# Patient Record
Sex: MALE | Race: BLACK OR AFRICAN AMERICAN | NOT HISPANIC OR LATINO | URBAN - METROPOLITAN AREA
[De-identification: names, ages, dates, MRNs, and addresses within clinical notes are randomized per-mention and may not be internally consistent; named-entity substitution may affect disease eponyms.]

---

## 2019-07-20 ENCOUNTER — EMERGENCY (EMERGENCY)
Facility: HOSPITAL | Age: 24
LOS: 0 days | Discharge: HOME | End: 2019-07-20
Payer: COMMERCIAL

## 2019-07-20 VITALS
OXYGEN SATURATION: 98 % | TEMPERATURE: 98 F | WEIGHT: 201.94 LBS | HEIGHT: 72 IN | DIASTOLIC BLOOD PRESSURE: 86 MMHG | HEART RATE: 72 BPM | SYSTOLIC BLOOD PRESSURE: 137 MMHG | RESPIRATION RATE: 18 BRPM

## 2019-07-20 DIAGNOSIS — H92.01 OTALGIA, RIGHT EAR: ICD-10-CM

## 2019-07-20 DIAGNOSIS — H65.191 OTHER ACUTE NONSUPPURATIVE OTITIS MEDIA, RIGHT EAR: ICD-10-CM

## 2019-07-20 DIAGNOSIS — H61.21 IMPACTED CERUMEN, RIGHT EAR: ICD-10-CM

## 2019-07-20 PROCEDURE — 69209 REMOVE IMPACTED EAR WAX UNI: CPT

## 2019-07-20 PROCEDURE — 99283 EMERGENCY DEPT VISIT LOW MDM: CPT | Mod: 25

## 2019-07-20 RX ORDER — IBUPROFEN 200 MG
600 TABLET ORAL ONCE
Refills: 0 | Status: COMPLETED | OUTPATIENT
Start: 2019-07-20 | End: 2019-07-20

## 2019-07-20 RX ADMIN — Medication 1 TABLET(S): at 12:19

## 2019-07-20 RX ADMIN — Medication 600 MILLIGRAM(S): at 12:19

## 2019-07-20 NOTE — ED PROVIDER NOTE - OBJECTIVE STATEMENT
23M here with right ear pain x 2 days, fullness in the ear. Denies fever, chills,nausea, vomiting. + recent URI

## 2019-07-20 NOTE — ED PROVIDER NOTE - ENMT, MLM
Airway patent, Nasal mucosa clear. Mouth with normal mucosa. Throat has no vesicles, no oropharyngeal exudates and uvula is midline.    cerumen impaction right ear- removed as per procedure note, revealing TM erythematous and bulging  No canal inflammation or mastoid tenderness

## 2019-07-20 NOTE — ED ADULT NURSE NOTE - NSIMPLEMENTINTERV_GEN_ALL_ED
Implemented All Universal Safety Interventions:  [place] to call system. Call bell, personal items and telephone within reach. Instruct patient to call for assistance. Room bathroom lighting operational. Non-slip footwear when patient is off stretcher. Physically safe environment: no spills, clutter or unnecessary equipment. Stretcher in lowest position, wheels locked, appropriate side rails in place.

## 2019-07-20 NOTE — ED PROVIDER NOTE - CARDIOVASCULAR NEGATIVE STATEMENT, MLM
no chest pain and no edema. pt will stop  Azilect for 10 days prior to surgery as per PMD  will continue carbidopa

## 2020-09-12 ENCOUNTER — EMERGENCY (EMERGENCY)
Facility: HOSPITAL | Age: 25
LOS: 0 days | Discharge: HOME | End: 2020-09-12
Payer: COMMERCIAL

## 2020-09-12 VITALS
WEIGHT: 195.11 LBS | HEART RATE: 96 BPM | RESPIRATION RATE: 16 BRPM | HEIGHT: 72 IN | OXYGEN SATURATION: 96 % | SYSTOLIC BLOOD PRESSURE: 131 MMHG | TEMPERATURE: 98 F | DIASTOLIC BLOOD PRESSURE: 95 MMHG

## 2020-09-12 LAB
APPEARANCE UR: CLEAR — SIGNIFICANT CHANGE UP
BACTERIA # UR AUTO: ABNORMAL
BILIRUB UR-MCNC: NEGATIVE — SIGNIFICANT CHANGE UP
COLOR SPEC: YELLOW — SIGNIFICANT CHANGE UP
DIFF PNL FLD: NEGATIVE — SIGNIFICANT CHANGE UP
GLUCOSE UR QL: NEGATIVE MG/DL — SIGNIFICANT CHANGE UP
HCG UR QL: NEGATIVE — SIGNIFICANT CHANGE UP
HIV 1 & 2 AB SERPL IA.RAPID: SIGNIFICANT CHANGE UP
KETONES UR-MCNC: NEGATIVE — SIGNIFICANT CHANGE UP
LEUKOCYTE ESTERASE UR-ACNC: ABNORMAL
NITRITE UR-MCNC: NEGATIVE — SIGNIFICANT CHANGE UP
PH UR: 8 — SIGNIFICANT CHANGE UP (ref 5–8)
PROT UR-MCNC: NEGATIVE MG/DL — SIGNIFICANT CHANGE UP
RBC CASTS # UR COMP ASSIST: ABNORMAL /HPF (ref 0–4)
SP GR SPEC: 1.01 — SIGNIFICANT CHANGE UP (ref 1.01–1.02)
UROBILINOGEN FLD QL: NEGATIVE MG/DL — SIGNIFICANT CHANGE UP
WBC UR QL: ABNORMAL

## 2020-09-12 PROCEDURE — 99284 EMERGENCY DEPT VISIT MOD MDM: CPT

## 2020-09-12 RX ORDER — CEFTRIAXONE 500 MG/1
250 INJECTION, POWDER, FOR SOLUTION INTRAMUSCULAR; INTRAVENOUS ONCE
Refills: 0 | Status: COMPLETED | OUTPATIENT
Start: 2020-09-12 | End: 2020-09-12

## 2020-09-12 RX ORDER — AZITHROMYCIN 500 MG/1
1000 TABLET, FILM COATED ORAL ONCE
Refills: 0 | Status: COMPLETED | OUTPATIENT
Start: 2020-09-12 | End: 2020-09-12

## 2020-09-12 RX ADMIN — CEFTRIAXONE 250 MILLIGRAM(S): 500 INJECTION, POWDER, FOR SOLUTION INTRAMUSCULAR; INTRAVENOUS at 19:00

## 2020-09-12 RX ADMIN — AZITHROMYCIN 1000 MILLIGRAM(S): 500 TABLET, FILM COATED ORAL at 19:00

## 2020-09-12 NOTE — ED PROVIDER NOTE - OBJECTIVE STATEMENT
Pt is a 24 year old male with no significant PMH who presents to the ED today for urinary symptoms. Pt c/o pus, and refuses genital exam. Denies headaches, nausea, vomit, abdominal pain, CP, back pain, fevers, chills, tingling, or generalized weakness. No aggravating or relieving factors. Patient denies EtOH/tobacco/illicit substance use.

## 2020-09-12 NOTE — ED PROVIDER NOTE - CLINICAL SUMMARY MEDICAL DECISION MAKING FREE TEXT BOX
no sex for 2 weeks. Pt treated for std in er and told he more than likey have std   Discussed results and outcome of testing with the patient.  Patient advised to please follow up with their primary care doctor within the next 24-48 hours and return to the Emergency Department for worsening symptoms or any other concerns.  Patient advised that their doctor may call  to follow up on the specific results of the tests performed today in the emergency department.

## 2020-09-12 NOTE — ED PROVIDER NOTE - PATIENT PORTAL LINK FT
You can access the FollowMyHealth Patient Portal offered by [facility] by registering at the following website: [email or web address]. By joining [employer]’s FollowMyHealth portal, you will also be able to view your health information using other applications (apps) compatible with our system.

## 2020-09-12 NOTE — ED PROVIDER NOTE - CHPI ED SYMPTOMS NEG
no dizziness/no decreased eating/drinking/no fever/no nausea/no numbness/no tingling/no weakness/no vomiting/no chills

## 2020-09-13 LAB
CULTURE RESULTS: NO GROWTH — SIGNIFICANT CHANGE UP
SPECIMEN SOURCE: SIGNIFICANT CHANGE UP

## 2020-09-14 NOTE — ED POST DISCHARGE NOTE - DETAILS
Voicemail message left for return call Voicemail message left for return call. Pt mother returned call, I asked for contact number for pt, number given is not taking messages.

## 2020-09-14 NOTE — ED POST DISCHARGE NOTE - RESULT SUMMARY
Urine Culture negative. awaiting STD results. Urine Culture negative. awaiting STD results. HIV to be followed by HIV clinic team.

## 2020-09-15 DIAGNOSIS — A64 UNSPECIFIED SEXUALLY TRANSMITTED DISEASE: ICD-10-CM

## 2020-09-15 DIAGNOSIS — R36.9 URETHRAL DISCHARGE, UNSPECIFIED: ICD-10-CM

## 2020-09-15 LAB
C TRACH RRNA SPEC QL NAA+PROBE: SIGNIFICANT CHANGE UP
N GONORRHOEA RRNA SPEC QL NAA+PROBE: DETECTED
SPECIMEN SOURCE: SIGNIFICANT CHANGE UP

## 2021-06-22 ENCOUNTER — EMERGENCY (EMERGENCY)
Facility: HOSPITAL | Age: 26
LOS: 0 days | Discharge: HOME | End: 2021-06-22
Payer: COMMERCIAL

## 2021-06-22 VITALS
SYSTOLIC BLOOD PRESSURE: 124 MMHG | RESPIRATION RATE: 18 BRPM | DIASTOLIC BLOOD PRESSURE: 76 MMHG | HEART RATE: 82 BPM | HEIGHT: 72 IN | TEMPERATURE: 98 F | OXYGEN SATURATION: 97 % | WEIGHT: 214.95 LBS

## 2021-06-22 DIAGNOSIS — S43.421A SPRAIN OF RIGHT ROTATOR CUFF CAPSULE, INITIAL ENCOUNTER: ICD-10-CM

## 2021-06-22 DIAGNOSIS — M25.511 PAIN IN RIGHT SHOULDER: ICD-10-CM
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
## 2021-06-22 PROCEDURE — 99284 EMERGENCY DEPT VISIT MOD MDM: CPT

## 2021-06-22 PROCEDURE — 73030 X-RAY EXAM OF SHOULDER: CPT | Mod: 26,RT

## 2021-06-22 RX ORDER — IBUPROFEN 200 MG
600 TABLET ORAL ONCE
Refills: 0 | Status: COMPLETED | OUTPATIENT
Start: 2021-06-22 | End: 2021-06-22

## 2021-06-22 RX ADMIN — Medication 600 MILLIGRAM(S): at 11:45
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
## 2021-06-22 NOTE — ED PROVIDER NOTE - PATIENT PORTAL LINK FT
You can access the FollowMyHealth Patient Portal offered by [facility] by registering at the following website: [email or web address]. By joining [employer]’s FollowMyHealth portal, you will also be able to view your health information using other applications (apps) compatible with our system.

## 2021-06-22 NOTE — ED ADULT NURSE NOTE - NS ED NURSE ELOPE COMMENTS
Pt left without providers awareness.  Xray was completed and was waiting for xray results.  THEO Sanchez saw pt in ED walking around and requested to return to fast track, pt refused and wanted to go home.
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
## 2021-09-17 NOTE — ED PROVIDER NOTE - PROGRESS NOTE DETAILS
Alternatives Discussed Intro (Do Not Add Period): I discussed alternative treatments to Mohs surgery and specifically discussed the risks and benefits of left prior to signing DC papers

## 2022-01-11 NOTE — ED ADULT NURSE NOTE - NS ED NURSE LEVEL OF CONSCIOUSNESS AFFECT
Pt returning 1st call to nurse to schedule injection he requested to be call at his cell phone [phone] [place]

## 2022-01-26 ENCOUNTER — TRANSCRIPTION ENCOUNTER (OUTPATIENT)
Age: 27
End: 2022-01-26

## 2022-01-26 ENCOUNTER — EMERGENCY (EMERGENCY)
Facility: HOSPITAL | Age: 27
LOS: 0 days | Discharge: HOME | End: 2022-01-27
Attending: EMERGENCY MEDICINE
Payer: COMMERCIAL

## 2022-01-26 VITALS
HEIGHT: 72 IN | HEART RATE: 95 BPM | TEMPERATURE: 98 F | RESPIRATION RATE: 17 BRPM | WEIGHT: 205.03 LBS | DIASTOLIC BLOOD PRESSURE: 69 MMHG | SYSTOLIC BLOOD PRESSURE: 112 MMHG | OXYGEN SATURATION: 98 %
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
## 2022-01-26 DIAGNOSIS — M25.562 PAIN IN LEFT KNEE: ICD-10-CM

## 2022-01-26 DIAGNOSIS — S82.135A NONDISPLACED FRACTURE OF MEDIAL CONDYLE OF LEFT TIBIA, INITIAL ENCOUNTER FOR CLOSED FRACTURE: ICD-10-CM
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
## 2022-01-26 PROCEDURE — 73564 X-RAY EXAM KNEE 4 OR MORE: CPT | Mod: 26,LT

## 2022-01-26 PROCEDURE — 99285 EMERGENCY DEPT VISIT HI MDM: CPT

## 2022-01-26 PROCEDURE — 73700 CT LOWER EXTREMITY W/O DYE: CPT | Mod: 26,LT,MA

## 2022-01-26 RX ORDER — OXYCODONE AND ACETAMINOPHEN 5; 325 MG/1; MG/1
1 TABLET ORAL ONCE
Refills: 0 | Status: DISCONTINUED | OUTPATIENT
Start: 2022-01-26 | End: 2022-01-26

## 2022-01-26 RX ORDER — IBUPROFEN 200 MG
600 TABLET ORAL ONCE
Refills: 0 | Status: COMPLETED | OUTPATIENT
Start: 2022-01-26 | End: 2022-01-26

## 2022-01-26 RX ADMIN — Medication 600 MILLIGRAM(S): at 21:17

## 2022-01-26 RX ADMIN — OXYCODONE AND ACETAMINOPHEN 1 TABLET(S): 5; 325 TABLET ORAL at 23:32

## 2022-01-26 RX ADMIN — OXYCODONE AND ACETAMINOPHEN 1 TABLET(S): 5; 325 TABLET ORAL at 22:32

## 2022-01-26 RX ADMIN — Medication 600 MILLIGRAM(S): at 22:26

## 2022-01-26 NOTE — ED ADULT NURSE NOTE - OBJECTIVE STATEMENT
received er chair 139 c/o l knee pain s/p injured while playing basketball states jumped and fell onto another individuals shoulder caused knee to hyperextend slight swelling noted no deformity

## 2022-01-26 NOTE — ED ADULT NURSE NOTE - NSIMPLEMENTINTERV_GEN_ALL_ED
Implemented All Fall Risk Interventions:  [place] to call system. Call bell, personal items and telephone within reach. Instruct patient to call for assistance. Room bathroom lighting operational. Non-slip footwear when patient is off stretcher. Physically safe environment: no spills, clutter or unnecessary equipment. Stretcher in lowest position, wheels locked, appropriate side rails in place. Provide visual cue, wrist band, yellow gown, etc. Monitor gait and stability. Monitor for mental status changes and reorient to person, place, and time. Review medications for side effects contributing to fall risk. Reinforce activity limits and safety measures with patient and family.

## 2022-01-26 NOTE — ED ADULT TRIAGE NOTE - CHIEF COMPLAINT QUOTE
pt c/o L-knee pain, states "I think I hyperextended it"  pt playing basketball, jumped up and came down,

## 2022-01-27 VITALS
TEMPERATURE: 98 F | OXYGEN SATURATION: 98 % | DIASTOLIC BLOOD PRESSURE: 73 MMHG | HEART RATE: 87 BPM | RESPIRATION RATE: 17 BRPM | SYSTOLIC BLOOD PRESSURE: 123 MMHG

## 2022-01-27 PROCEDURE — 73590 X-RAY EXAM OF LOWER LEG: CPT | Mod: 26,LT

## 2022-01-27 RX ORDER — IBUPROFEN 200 MG
1 TABLET ORAL
Qty: 20 | Refills: 0
Start: 2022-01-27 | End: 2022-01-31

## 2022-01-27 NOTE — ED PROVIDER NOTE - CLINICAL SUMMARY MEDICAL DECISION MAKING FREE TEXT BOX
Pt with significant left knee pain, unable to stand or bear weight. Will give ice pack, pain meds and check x-ray.

## 2022-01-27 NOTE — CONSULT NOTE ADULT - SUBJECTIVE AND OBJECTIVE BOX
26y Male presents s/p direct collision injury into another players knee playing basketball this evening. Reports his knee knocked into another player and may have hyperextended. Denies numbness/tingling in the affected extremity. Denies head strike/LOC/other orthopedic injuries at this time. PPatient ambulates without assistance at baseline.    PAST MEDICAL & SURGICAL HISTORY:  No pertinent past medical history    No significant past surgical history      Home Medications:    Allergies    No Known Allergies    Intolerances      Vital Signs Last 24 Hrs  T(C): 36.8 (27 Jan 2022 00:33), Max: 36.8 (26 Jan 2022 19:43)  T(F): 98.2 (27 Jan 2022 00:33), Max: 98.3 (26 Jan 2022 19:43)  HR: 87 (27 Jan 2022 00:33) (87 - 95)  BP: 123/73 (27 Jan 2022 00:33) (112/69 - 123/73)  BP(mean): --  RR: 17 (27 Jan 2022 00:33) (17 - 17)  SpO2: 98% (27 Jan 2022 00:33) (98% - 98%)    PHYSICAL EXAM  General: NAD, Awake and Alert    LLE:  skin intact, effusion noted suprapatellar  SOFT/Compressible compartments of the tibia/thigh  mildly TTP about medial/lateral tibial plateau  NTTP about tibia distal to plateau   NTTP about femur/hip/ankle  +sensation L2-S1  +dorsiflexion/plantarflexion of ankle/hallux  +dorsalis pedis pulse  Soft compartments, - calf tenderness    Placed in bulky [place] knee immobilizer     Secondary Exam: Benign, Skin intact, NTTP along axial spine, SILT throughout, motor grossly intact throughout, no other orthopedic injuries at this time, compartments soft and compressible        IMAGING:  CT : Left NONDISPLACED medial tibial plateau fx    Assessment/Plan:  26y Male with LEFT nondisplaced medial tibial plateau fx    -Placed in bulky [place] knee immobilizer  -No concern for compartment syndrome  -Discussed signs/symptoms of compartment syndrome patient will return if any occur  -NWB LLE in BJKI with crutches   -Rest/ice/elevation/NSAIDS  -Pain control as needed  -Discussed w/ patient to f/u w/ Dr. Lopez in office early next week  -No acute orthopedic surgical intervention needed at this time  -Discussed with attending who agrees with plan  -Ortho stable for discharge     26y Male presents s/p direct collision injury into another players knee playing basketball this evening. Reports his knee knocked into another player and may have hyperextended. Denies numbness/tingling in the affected extremity. Denies head strike/LOC/other orthopedic injuries at this time. PPatient ambulates without assistance at baseline.    PAST MEDICAL & SURGICAL HISTORY:  No pertinent past medical history    No significant past surgical history      Home Medications:    Allergies    No Known Allergies    Intolerances      Vital Signs Last 24 Hrs  T(C): 36.8 (27 Jan 2022 00:33), Max: 36.8 (26 Jan 2022 19:43)  T(F): 98.2 (27 Jan 2022 00:33), Max: 98.3 (26 Jan 2022 19:43)  HR: 87 (27 Jan 2022 00:33) (87 - 95)  BP: 123/73 (27 Jan 2022 00:33) (112/69 - 123/73)  BP(mean): --  RR: 17 (27 Jan 2022 00:33) (17 - 17)  SpO2: 98% (27 Jan 2022 00:33) (98% - 98%)    PHYSICAL EXAM  General: NAD, Awake and Alert    LLE:  skin intact, effusion noted suprapatellar  SOFT/Compressible compartments of the tibia/thigh  mildly TTP about medial/lateral tibial plateau  NTTP about tibia distal to plateau   NTTP about femur/hip/ankle  No varus valgus instability appreciated  negative anterior drawer test but may be limited due to suprapatellar effusion  +sensation L2-S1  +dorsiflexion/plantarflexion of ankle/hallux  +dorsalis pedis pulse  Soft compartments, - calf tenderness    Placed in bulky [place] knee immobilizer     Secondary Exam: Benign, Skin intact, NTTP along axial spine, SILT throughout, motor grossly intact throughout, no other orthopedic injuries at this time, compartments soft and compressible        IMAGING:  CT : Left NONDISPLACED medial tibial plateau fx possibly tibial spine avulsion     Assessment/Plan:  26y Male with LEFT nondisplaced medial tibial plateau fx    -Placed in bulky [place] knee immobilizer  -No concern for compartment syndrome  -Discussed signs/symptoms of compartment syndrome patient will return if any occur  -NWB LLE in BJKI with crutches   -Rest/ice/elevation/NSAIDS  -Pain control as needed  -Discussed w/ patient to f/u w/ Dr. Lopez in office early next week  -Discussed possibility of requiring operative fixation  -No acute orthopedic surgical intervention needed at this time  -Discussed with attending who agrees with plan  -Ortho stable for discharge

## 2022-01-27 NOTE — ED PROVIDER NOTE - PATIENT PORTAL LINK FT
You can access the FollowMyHealth Patient Portal offered by [facility] by registering at the following website: [email or web address]. By joining [employer]’s FollowMyHealth portal, you will also be able to view your health information using other applications (apps) compatible with our system.

## 2022-01-27 NOTE — ED PROVIDER NOTE - CONSTITUTIONAL, MLM
Uncomfortable, appearing, awake, alert, oriented to person, place, time/situation and in no apparent distress. normal...

## 2022-01-27 NOTE — ED PROVIDER NOTE - PROGRESS NOTE DETAILS
Patient care signed out by Dr. Espinosa pending CT knee, + for tibial plateau fracture.  Patient evaluated by ortho in house, placed in knee immobilizer, crutches, NWB, advised to follow up with Dr. Lopez in 1 week, see ortho note.

## 2022-01-27 NOTE — ED PROVIDER NOTE - OBJECTIVE STATEMENT
25 yo male w/no pertinent PMH presents to the ED for left knee pain. Pt states he was playing basketball, jumped up and came down on his opponent who was standing and felt his knee hyperextend and felt a pop to knee. Pt unable to bear weight since. Reports 10/10 pain to leg. No prior injuries to bones.

## 2022-01-27 NOTE — ED PROVIDER NOTE - MUSCULOSKELETAL, MLM
Spine appears normal, unable to bear weight, TTP to the left knee anteriorly, minimal swelling, decreased ROM, 2+ DP pulses.

## 2022-09-08 NOTE — ED PROVIDER NOTE - CLINICAL SUMMARY MEDICAL DECISION MAKING FREE TEXT BOX
Cerumen impaction s/op removal with otitis media. Recommend augmentin, nsaids, outpt f/u Stage 2: Additional Anesthesia Type: 1% lidocaine with epinephrine

## 2023-03-31 NOTE — ED PROVIDER NOTE - NS_EDPROVIDERDISPOUSERTYPE_ED_A_ED
without difficulty I have personally evaluated and examined the patient. The Attending was available to me as a supervising provider if needed.
